# Patient Record
Sex: MALE | Race: WHITE | NOT HISPANIC OR LATINO | URBAN - METROPOLITAN AREA
[De-identification: names, ages, dates, MRNs, and addresses within clinical notes are randomized per-mention and may not be internally consistent; named-entity substitution may affect disease eponyms.]

---

## 2022-11-03 ENCOUNTER — WEB ENCOUNTER (OUTPATIENT)
Dept: URBAN - METROPOLITAN AREA CLINIC 94 | Facility: CLINIC | Age: 57
End: 2022-11-03

## 2022-11-07 ENCOUNTER — WEB ENCOUNTER (OUTPATIENT)
Dept: URBAN - METROPOLITAN AREA CLINIC 94 | Facility: CLINIC | Age: 57
End: 2022-11-07

## 2022-11-07 ENCOUNTER — OFFICE VISIT (OUTPATIENT)
Dept: URBAN - METROPOLITAN AREA CLINIC 94 | Facility: CLINIC | Age: 57
End: 2022-11-07
Payer: MEDICARE

## 2022-11-07 VITALS
WEIGHT: 241 LBS | HEIGHT: 70 IN | DIASTOLIC BLOOD PRESSURE: 82 MMHG | BODY MASS INDEX: 34.5 KG/M2 | HEART RATE: 63 BPM | SYSTOLIC BLOOD PRESSURE: 145 MMHG

## 2022-11-07 DIAGNOSIS — K21.9 GASTROESOPHAGEAL REFLUX DISEASE, UNSPECIFIED WHETHER ESOPHAGITIS PRESENT: ICD-10-CM

## 2022-11-07 DIAGNOSIS — R11.0 NAUSEA: ICD-10-CM

## 2022-11-07 PROBLEM — 235595009: Status: ACTIVE | Noted: 2022-11-07

## 2022-11-07 PROCEDURE — 99244 OFF/OP CNSLTJ NEW/EST MOD 40: CPT | Performed by: INTERNAL MEDICINE

## 2022-11-07 PROCEDURE — 99204 OFFICE O/P NEW MOD 45 MIN: CPT | Performed by: INTERNAL MEDICINE

## 2022-11-07 RX ORDER — DEXLANSOPRAZOLE 30 MG/1
1 CAPSULE CAPSULE, DELAYED RELEASE ORAL ONCE A DAY
Status: ACTIVE | COMMUNITY

## 2022-11-07 RX ORDER — TEMAZEPAM 30 MG/1
1 CAPSULE AT BEDTIME AS NEEDED CAPSULE ORAL ONCE A DAY
Status: ACTIVE | COMMUNITY

## 2022-11-07 RX ORDER — ACYCLOVIR 50 MG/G
1 APPLICATION CREAM TOPICAL THREE TIMES A DAY
Status: ON HOLD | COMMUNITY

## 2022-11-07 RX ORDER — GALCANEZUMAB 120 MG/ML
AS DIRECTED INJECTION, SOLUTION SUBCUTANEOUS
Status: ACTIVE | COMMUNITY

## 2022-11-07 RX ORDER — TRAZODONE HYDROCHLORIDE 100 MG/1
1 TABLET AT BEDTIME TABLET ORAL ONCE A DAY
Status: ACTIVE | COMMUNITY

## 2022-11-07 RX ORDER — TIMOLOL MALEATE 2.5 MG/ML
1 DROP INTO AFFECTED EYE SOLUTION/ DROPS OPHTHALMIC TWICE A DAY
Status: ACTIVE | COMMUNITY

## 2022-11-07 RX ORDER — METOPROLOL TARTRATE 50 MG/1
1 TABLET WITH FOOD TABLET, FILM COATED ORAL TWICE A DAY
Status: ACTIVE | COMMUNITY

## 2022-11-07 RX ORDER — INDOMETHACIN 50 MG/1
1 CAPSULE WITH FOOD OR MILK CAPSULE ORAL TWICE A DAY
Status: DISCONTINUED | COMMUNITY

## 2022-11-07 RX ORDER — ACETAMINOPHEN AND CODEINE PHOSPHATE 300; 30 MG/1; MG/1
1 TABLET AS NEEDED TABLET ORAL BID
Status: ACTIVE | COMMUNITY

## 2022-11-07 RX ORDER — MELOXICAM 15 MG/1
TAKE 1 TABLET BY MOUTH DAILY TABLET ORAL ONCE A DAY
Refills: 0 | Status: ACTIVE | COMMUNITY

## 2022-11-07 RX ORDER — ONABOTULINUMTOXINA 200 [USP'U]/1
AS DIRECTED INJECTION, POWDER, LYOPHILIZED, FOR SOLUTION INTRADERMAL; INTRAMUSCULAR
Status: DISCONTINUED | COMMUNITY

## 2022-11-07 RX ORDER — BRINZOLAMIDE 10 MG/ML
1 DROP INTO AFFECTED EYE SUSPENSION/ DROPS OPHTHALMIC BID
Status: ACTIVE | COMMUNITY

## 2022-11-07 RX ORDER — TIZANIDINE 4 MG/1
1 TABLET AS NEEDED TABLET ORAL
Refills: 0 | Status: ACTIVE | COMMUNITY

## 2022-11-07 NOTE — HPI-TODAY'S VISIT:
This is a 57  year old patient referred by Dr Irvin Hernandez for nausea, bloating and GERD A copy of this note will be sent to the referring provider.  Pt is a poor historian He has GERD for several years. Taking dexilant for > 5 years now. GERD symptoms seem mostly well controlled. However , he has excessive nausea, fullness and bloating after eating.Denies dysphagia.  Stools are dark, however denies black stools. BMs are described as normal Pt had EGD and COLONOSCOPY by Dr Peraza from surgery in 1/2018 which reportedly showed esophagitis, gastritis without H Pylori, diverticulosis and hemorrhoids. Denies family history of colorectal neoplasm.

## 2022-11-08 ENCOUNTER — CLAIMS CREATED FROM THE CLAIM WINDOW (OUTPATIENT)
Dept: URBAN - METROPOLITAN AREA CLINIC 4 | Facility: CLINIC | Age: 57
End: 2022-11-08
Payer: MEDICARE

## 2022-11-08 ENCOUNTER — CLAIMS CREATED FROM THE CLAIM WINDOW (OUTPATIENT)
Dept: URBAN - METROPOLITAN AREA SURGERY CENTER 17 | Facility: SURGERY CENTER | Age: 57
End: 2022-11-08
Payer: MEDICARE

## 2022-11-08 ENCOUNTER — CLAIMS CREATED FROM THE CLAIM WINDOW (OUTPATIENT)
Dept: URBAN - METROPOLITAN AREA SURGERY CENTER 17 | Facility: SURGERY CENTER | Age: 57
End: 2022-11-08

## 2022-11-08 DIAGNOSIS — R10.13 ABDOMINAL DISCOMFORT, EPIGASTRIC: ICD-10-CM

## 2022-11-08 DIAGNOSIS — K21.9 ACID REFLUX: ICD-10-CM

## 2022-11-08 DIAGNOSIS — K31.89 OTHER DISEASES OF STOMACH AND DUODENUM: ICD-10-CM

## 2022-11-08 DIAGNOSIS — K31.89 ACQUIRED DEFORMITY OF DUODENUM: ICD-10-CM

## 2022-11-08 PROCEDURE — 88305 TISSUE EXAM BY PATHOLOGIST: CPT | Performed by: PATHOLOGY

## 2022-11-08 PROCEDURE — G8907 PT DOC NO EVENTS ON DISCHARG: HCPCS | Performed by: INTERNAL MEDICINE

## 2022-11-08 PROCEDURE — 43239 EGD BIOPSY SINGLE/MULTIPLE: CPT | Performed by: INTERNAL MEDICINE

## 2022-11-08 RX ORDER — TIMOLOL MALEATE 2.5 MG/ML
1 DROP INTO AFFECTED EYE SOLUTION/ DROPS OPHTHALMIC TWICE A DAY
Status: ACTIVE | COMMUNITY

## 2022-11-08 RX ORDER — TEMAZEPAM 30 MG/1
1 CAPSULE AT BEDTIME AS NEEDED CAPSULE ORAL ONCE A DAY
Status: ACTIVE | COMMUNITY

## 2022-11-08 RX ORDER — MELOXICAM 15 MG/1
TAKE 1 TABLET BY MOUTH DAILY TABLET ORAL ONCE A DAY
Refills: 0 | Status: ACTIVE | COMMUNITY

## 2022-11-08 RX ORDER — ACETAMINOPHEN AND CODEINE PHOSPHATE 300; 30 MG/1; MG/1
1 TABLET AS NEEDED TABLET ORAL BID
Status: ACTIVE | COMMUNITY

## 2022-11-08 RX ORDER — TIZANIDINE 4 MG/1
1 TABLET AS NEEDED TABLET ORAL
Refills: 0 | Status: ACTIVE | COMMUNITY

## 2022-11-08 RX ORDER — DEXLANSOPRAZOLE 30 MG/1
1 CAPSULE CAPSULE, DELAYED RELEASE ORAL ONCE A DAY
Status: ACTIVE | COMMUNITY

## 2022-11-08 RX ORDER — GALCANEZUMAB 120 MG/ML
AS DIRECTED INJECTION, SOLUTION SUBCUTANEOUS
Status: ACTIVE | COMMUNITY

## 2022-11-08 RX ORDER — ACYCLOVIR 50 MG/G
1 APPLICATION CREAM TOPICAL THREE TIMES A DAY
Status: ON HOLD | COMMUNITY

## 2022-11-08 RX ORDER — METOPROLOL TARTRATE 50 MG/1
1 TABLET WITH FOOD TABLET, FILM COATED ORAL TWICE A DAY
Status: ACTIVE | COMMUNITY

## 2022-11-08 RX ORDER — TRAZODONE HYDROCHLORIDE 100 MG/1
1 TABLET AT BEDTIME TABLET ORAL ONCE A DAY
Status: ACTIVE | COMMUNITY

## 2022-11-08 RX ORDER — BRINZOLAMIDE 10 MG/ML
1 DROP INTO AFFECTED EYE SUSPENSION/ DROPS OPHTHALMIC BID
Status: ACTIVE | COMMUNITY

## 2022-12-05 ENCOUNTER — LAB OUTSIDE AN ENCOUNTER (OUTPATIENT)
Dept: URBAN - METROPOLITAN AREA CLINIC 94 | Facility: CLINIC | Age: 57
End: 2022-12-05

## 2022-12-05 ENCOUNTER — OFFICE VISIT (OUTPATIENT)
Dept: URBAN - METROPOLITAN AREA CLINIC 94 | Facility: CLINIC | Age: 57
End: 2022-12-05
Payer: MEDICARE

## 2022-12-05 VITALS
WEIGHT: 243 LBS | BODY MASS INDEX: 34.79 KG/M2 | SYSTOLIC BLOOD PRESSURE: 144 MMHG | TEMPERATURE: 97.5 F | HEART RATE: 53 BPM | DIASTOLIC BLOOD PRESSURE: 86 MMHG | HEIGHT: 70 IN

## 2022-12-05 DIAGNOSIS — R53.83 FATIGUE, UNSPECIFIED TYPE: ICD-10-CM

## 2022-12-05 DIAGNOSIS — R14.0 BLOATING: ICD-10-CM

## 2022-12-05 DIAGNOSIS — R19.7 DIARRHEA, UNSPECIFIED TYPE: ICD-10-CM

## 2022-12-05 PROCEDURE — 99214 OFFICE O/P EST MOD 30 MIN: CPT | Performed by: INTERNAL MEDICINE

## 2022-12-05 RX ORDER — MELOXICAM 15 MG/1
TAKE 1 TABLET BY MOUTH DAILY TABLET ORAL ONCE A DAY
Refills: 0 | Status: ACTIVE | COMMUNITY

## 2022-12-05 RX ORDER — GALCANEZUMAB 120 MG/ML
AS DIRECTED INJECTION, SOLUTION SUBCUTANEOUS
Status: ACTIVE | COMMUNITY

## 2022-12-05 RX ORDER — ACYCLOVIR 50 MG/G
1 APPLICATION CREAM TOPICAL THREE TIMES A DAY
Status: ON HOLD | COMMUNITY

## 2022-12-05 RX ORDER — METOPROLOL TARTRATE 50 MG/1
1 TABLET WITH FOOD TABLET, FILM COATED ORAL TWICE A DAY
Status: ACTIVE | COMMUNITY

## 2022-12-05 RX ORDER — DICYCLOMINE HYDROCHLORIDE 10 MG/1
1 TABLET CAPSULE ORAL THREE TIMES A DAY
Qty: 90 TABLET | Refills: 3 | OUTPATIENT
Start: 2022-12-05 | End: 2023-04-04

## 2022-12-05 RX ORDER — TEMAZEPAM 30 MG/1
1 CAPSULE AT BEDTIME AS NEEDED CAPSULE ORAL ONCE A DAY
Status: ACTIVE | COMMUNITY

## 2022-12-05 RX ORDER — BRINZOLAMIDE 10 MG/ML
1 DROP INTO AFFECTED EYE SUSPENSION/ DROPS OPHTHALMIC BID
Status: ACTIVE | COMMUNITY

## 2022-12-05 RX ORDER — TIZANIDINE 4 MG/1
1 TABLET AS NEEDED TABLET ORAL
Refills: 0 | Status: ACTIVE | COMMUNITY

## 2022-12-05 RX ORDER — ACETAMINOPHEN AND CODEINE PHOSPHATE 300; 30 MG/1; MG/1
1 TABLET AS NEEDED TABLET ORAL BID
Status: ACTIVE | COMMUNITY

## 2022-12-05 RX ORDER — DEXLANSOPRAZOLE 30 MG/1
1 CAPSULE CAPSULE, DELAYED RELEASE ORAL ONCE A DAY
Status: ACTIVE | COMMUNITY

## 2022-12-05 RX ORDER — TRAZODONE HYDROCHLORIDE 100 MG/1
1 TABLET AT BEDTIME TABLET ORAL ONCE A DAY
Status: ACTIVE | COMMUNITY

## 2022-12-05 RX ORDER — TIMOLOL MALEATE 2.5 MG/ML
1 DROP INTO AFFECTED EYE SOLUTION/ DROPS OPHTHALMIC TWICE A DAY
Status: ACTIVE | COMMUNITY

## 2022-12-06 LAB
A/G RATIO: 2.4
ABSOLUTE BASOPHILS: 30
ABSOLUTE EOSINOPHILS: 189
ABSOLUTE LYMPHOCYTES: 1274
ABSOLUTE MONOCYTES: 608
ABSOLUTE NEUTROPHILS: 3800
ALBUMIN: 4.4
ALKALINE PHOSPHATASE: 46
ALT (SGPT): 29
AST (SGOT): 21
BASOPHILS: 0.5
BILIRUBIN, TOTAL: 0.9
BUN/CREATININE RATIO: (no result)
BUN: 14
C-REACTIVE PROTEIN, QUANT: 0.7
CALCIUM: 9.6
CARBON DIOXIDE, TOTAL: 27
CHLORIDE: 105
CREATININE: 0.9
EGFR: 100
EOSINOPHILS: 3.2
GLOBULIN, TOTAL: 1.8
GLUCOSE: 89
HEMATOCRIT: 44.2
HEMOGLOBIN: 14.9
IMMUNOGLOBULIN A, QN, SERUM: 157
INTERPRETATION: (no result)
LYMPHOCYTES: 21.6
MCH: 28
MCHC: 33.7
MCV: 82.9
MONOCYTES: 10.3
MPV: 10.9
NEUTROPHILS: 64.4
PLATELET COUNT: 151
POTASSIUM: 4.1
PROTEIN, TOTAL: 6.2
RDW: 13.9
RED BLOOD CELL COUNT: 5.33
SODIUM: 140
T-TRANSGLUTAMINASE (TTG) IGA: <1
TSH: 0.88
WHITE BLOOD CELL COUNT: 5.9

## 2022-12-09 ENCOUNTER — LAB OUTSIDE AN ENCOUNTER (OUTPATIENT)
Dept: URBAN - METROPOLITAN AREA CLINIC 94 | Facility: CLINIC | Age: 57
End: 2022-12-09

## 2022-12-15 ENCOUNTER — TELEPHONE ENCOUNTER (OUTPATIENT)
Dept: URBAN - METROPOLITAN AREA CLINIC 92 | Facility: CLINIC | Age: 57
End: 2022-12-15

## 2022-12-15 LAB
CALPROTECTIN, STOOL - QDX: (no result)
GASTROINTESTINAL PATHOGEN: (no result)
PANCREATICELASTASE ELISA, STOOL: (no result)

## 2022-12-15 RX ORDER — AZITHROMYCIN MONOHYDRATE 500 MG/1
1 TABLET TABLET, FILM COATED ORAL ONCE A DAY
Qty: 3 TABLET | Refills: 1 | OUTPATIENT

## 2023-01-23 ENCOUNTER — OFFICE VISIT (OUTPATIENT)
Dept: URBAN - METROPOLITAN AREA CLINIC 94 | Facility: CLINIC | Age: 58
End: 2023-01-23
Payer: MEDICARE

## 2023-01-23 ENCOUNTER — LAB OUTSIDE AN ENCOUNTER (OUTPATIENT)
Dept: URBAN - METROPOLITAN AREA CLINIC 94 | Facility: CLINIC | Age: 58
End: 2023-01-23

## 2023-01-23 VITALS
HEIGHT: 70 IN | HEART RATE: 69 BPM | SYSTOLIC BLOOD PRESSURE: 116 MMHG | WEIGHT: 244 LBS | TEMPERATURE: 97 F | BODY MASS INDEX: 34.93 KG/M2 | DIASTOLIC BLOOD PRESSURE: 76 MMHG

## 2023-01-23 DIAGNOSIS — R10.84 GENERALIZED ABDOMINAL PAIN: ICD-10-CM

## 2023-01-23 DIAGNOSIS — R14.0 BLOATING: ICD-10-CM

## 2023-01-23 PROCEDURE — 99214 OFFICE O/P EST MOD 30 MIN: CPT | Performed by: INTERNAL MEDICINE

## 2023-01-23 RX ORDER — METOPROLOL TARTRATE 50 MG/1
1 TABLET WITH FOOD TABLET, FILM COATED ORAL TWICE A DAY
Status: ACTIVE | COMMUNITY

## 2023-01-23 RX ORDER — DICYCLOMINE HYDROCHLORIDE 10 MG/1
1 TABLET CAPSULE ORAL THREE TIMES A DAY
Qty: 90 TABLET | Refills: 3 | Status: ACTIVE | COMMUNITY
Start: 2022-12-05 | End: 2023-04-04

## 2023-01-23 RX ORDER — BRINZOLAMIDE 10 MG/ML
1 DROP INTO AFFECTED EYE SUSPENSION/ DROPS OPHTHALMIC BID
Status: ACTIVE | COMMUNITY

## 2023-01-23 RX ORDER — TIMOLOL MALEATE 2.5 MG/ML
1 DROP INTO AFFECTED EYE SOLUTION/ DROPS OPHTHALMIC TWICE A DAY
Status: ACTIVE | COMMUNITY

## 2023-01-23 RX ORDER — ACYCLOVIR 50 MG/G
1 APPLICATION CREAM TOPICAL THREE TIMES A DAY
Status: ON HOLD | COMMUNITY

## 2023-01-23 RX ORDER — TEMAZEPAM 30 MG/1
1 CAPSULE AT BEDTIME AS NEEDED CAPSULE ORAL ONCE A DAY
Status: ACTIVE | COMMUNITY

## 2023-01-23 RX ORDER — AZITHROMYCIN MONOHYDRATE 500 MG/1
1 TABLET TABLET, FILM COATED ORAL ONCE A DAY
Qty: 3 TABLET | Refills: 1 | Status: DISCONTINUED | COMMUNITY

## 2023-01-23 RX ORDER — GALCANEZUMAB 120 MG/ML
AS DIRECTED INJECTION, SOLUTION SUBCUTANEOUS
Status: ACTIVE | COMMUNITY

## 2023-01-23 RX ORDER — DEXLANSOPRAZOLE 30 MG/1
1 CAPSULE CAPSULE, DELAYED RELEASE ORAL ONCE A DAY
Status: ACTIVE | COMMUNITY

## 2023-01-23 RX ORDER — ACETAMINOPHEN AND CODEINE PHOSPHATE 300; 30 MG/1; MG/1
1 TABLET AS NEEDED TABLET ORAL BID
Status: ACTIVE | COMMUNITY

## 2023-01-23 RX ORDER — TRAZODONE HYDROCHLORIDE 100 MG/1
1 TABLET AT BEDTIME TABLET ORAL ONCE A DAY
Status: ACTIVE | COMMUNITY

## 2023-01-23 RX ORDER — MELOXICAM 15 MG/1
TAKE 1 TABLET BY MOUTH DAILY TABLET ORAL ONCE A DAY
Refills: 0 | Status: ACTIVE | COMMUNITY

## 2023-01-23 RX ORDER — TIZANIDINE 4 MG/1
1 TABLET AS NEEDED TABLET ORAL
Refills: 0 | Status: ACTIVE | COMMUNITY

## 2023-01-23 NOTE — HPI-TODAY'S VISIT:
Pt evaluated for GERD, abdominal pain, bloating , chronic diarrhea in 11/2022 EGD 11/2022 showed reflux esophagitis. Gastric and duodenal biopsies were normal. Stool studies showed enteroagressive E Coli for which he was treated with azithromycin Fecal elastase and calprotectin: WNL CBC, CMP, CRP, TSH, celiac panel: WNL Pt reportedly had colonoscopy by Dr Peraza in 2018 which reported diverticulosis and hemorrhoids. Pt on dicylomine and dexilant, which have not helped. Diarrhea improved after taking azithromycin Pt continue to have abdominal pain with bloating. Pain mostly in upper abdomen but becomes diffuse. Pain worsens with food Bloating and gas all the time

## 2023-02-09 ENCOUNTER — WEB ENCOUNTER (OUTPATIENT)
Dept: URBAN - METROPOLITAN AREA CLINIC 94 | Facility: CLINIC | Age: 58
End: 2023-02-09

## 2023-02-23 ENCOUNTER — OFFICE VISIT (OUTPATIENT)
Dept: URBAN - METROPOLITAN AREA CLINIC 94 | Facility: CLINIC | Age: 58
End: 2023-02-23
Payer: MEDICARE

## 2023-02-23 ENCOUNTER — TELEPHONE ENCOUNTER (OUTPATIENT)
Dept: URBAN - METROPOLITAN AREA CLINIC 94 | Facility: CLINIC | Age: 58
End: 2023-02-23

## 2023-02-23 ENCOUNTER — LAB OUTSIDE AN ENCOUNTER (OUTPATIENT)
Dept: URBAN - METROPOLITAN AREA CLINIC 94 | Facility: CLINIC | Age: 58
End: 2023-02-23

## 2023-02-23 VITALS
WEIGHT: 244 LBS | HEART RATE: 74 BPM | SYSTOLIC BLOOD PRESSURE: 128 MMHG | BODY MASS INDEX: 34.93 KG/M2 | HEIGHT: 70 IN | DIASTOLIC BLOOD PRESSURE: 77 MMHG | TEMPERATURE: 97.3 F

## 2023-02-23 DIAGNOSIS — R19.7 DIARRHEA, UNSPECIFIED TYPE: ICD-10-CM

## 2023-02-23 DIAGNOSIS — R10.33 PERIUMBILICAL ABDOMINAL PAIN: ICD-10-CM

## 2023-02-23 DIAGNOSIS — R16.1 SPLENOMEGALY: ICD-10-CM

## 2023-02-23 DIAGNOSIS — R11.0 NAUSEA: ICD-10-CM

## 2023-02-23 PROCEDURE — 99214 OFFICE O/P EST MOD 30 MIN: CPT | Performed by: INTERNAL MEDICINE

## 2023-02-23 RX ORDER — ACETAMINOPHEN AND CODEINE PHOSPHATE 300; 30 MG/1; MG/1
1 TABLET AS NEEDED TABLET ORAL BID
Status: ACTIVE | COMMUNITY

## 2023-02-23 RX ORDER — DEXLANSOPRAZOLE 30 MG/1
1 CAPSULE CAPSULE, DELAYED RELEASE ORAL ONCE A DAY
Status: ACTIVE | COMMUNITY

## 2023-02-23 RX ORDER — TIMOLOL MALEATE 2.5 MG/ML
1 DROP INTO AFFECTED EYE SOLUTION/ DROPS OPHTHALMIC TWICE A DAY
Status: ACTIVE | COMMUNITY

## 2023-02-23 RX ORDER — ACYCLOVIR 50 MG/G
1 APPLICATION CREAM TOPICAL THREE TIMES A DAY
Status: ON HOLD | COMMUNITY

## 2023-02-23 RX ORDER — DICYCLOMINE HYDROCHLORIDE 20 MG/1
1 TABLET TABLET ORAL THREE TIMES A DAY
Qty: 90 TABLET | Refills: 6
Start: 2022-12-05 | End: 2023-09-21

## 2023-02-23 RX ORDER — GALCANEZUMAB 120 MG/ML
AS DIRECTED INJECTION, SOLUTION SUBCUTANEOUS
Status: ACTIVE | COMMUNITY

## 2023-02-23 RX ORDER — MELOXICAM 15 MG/1
TAKE 1 TABLET BY MOUTH DAILY TABLET ORAL ONCE A DAY
Refills: 0 | Status: ACTIVE | COMMUNITY

## 2023-02-23 RX ORDER — METOPROLOL TARTRATE 50 MG/1
1 TABLET WITH FOOD TABLET, FILM COATED ORAL TWICE A DAY
Status: ACTIVE | COMMUNITY

## 2023-02-23 RX ORDER — BRINZOLAMIDE 10 MG/ML
1 DROP INTO AFFECTED EYE SUSPENSION/ DROPS OPHTHALMIC BID
Status: ACTIVE | COMMUNITY

## 2023-02-23 RX ORDER — TEMAZEPAM 30 MG/1
1 CAPSULE AT BEDTIME AS NEEDED CAPSULE ORAL ONCE A DAY
Status: ACTIVE | COMMUNITY

## 2023-02-23 RX ORDER — TIZANIDINE 4 MG/1
1 TABLET AS NEEDED TABLET ORAL
Refills: 0 | Status: ACTIVE | COMMUNITY

## 2023-02-23 RX ORDER — DICYCLOMINE HYDROCHLORIDE 10 MG/1
1 TABLET CAPSULE ORAL THREE TIMES A DAY
Qty: 90 TABLET | Refills: 3 | Status: ACTIVE | COMMUNITY
Start: 2022-12-05 | End: 2023-04-04

## 2023-02-23 RX ORDER — TRAZODONE HYDROCHLORIDE 100 MG/1
1 TABLET AT BEDTIME TABLET ORAL ONCE A DAY
Status: ACTIVE | COMMUNITY

## 2023-02-23 NOTE — HPI-TODAY'S VISIT:
Pt evaluated for GERD, abdominal pain, bloating , chronic diarrhea in 11/2022 EGD 11/2022 showed reflux esophagitis. Gastric and duodenal biopsies were normal. Stool studies showed enteroagressive E Coli for which he was treated with azithromycin Fecal elastase and calprotectin: WNL CBC, CMP, CRP, TSH, celiac panel: WNL Pt reportedly had colonoscopy by Dr Peraza in 2018 which reported diverticulosis and hemorrhoids.  Diarrhea improved after taking azithromycin  CT A/P 1/2023: Splenomegaly and bilateral adrenal adenomas HBT: Negative for SIBO.  Pt was started on IB guard, dexilant and dicyclomine 1 month ago. Pt states that he never got dicyclomine.  He has postprandial abdominal pain with fullness in tim umbilical area. Nausea without vomiting Bloating, cramping and diarrhea after eating Denies anorexia or weight loss.

## 2023-04-05 ENCOUNTER — OFFICE VISIT (OUTPATIENT)
Dept: URBAN - METROPOLITAN AREA CLINIC 94 | Facility: CLINIC | Age: 58
End: 2023-04-05
Payer: MEDICARE

## 2023-04-05 VITALS
WEIGHT: 247 LBS | DIASTOLIC BLOOD PRESSURE: 71 MMHG | HEIGHT: 70 IN | HEART RATE: 66 BPM | BODY MASS INDEX: 35.36 KG/M2 | TEMPERATURE: 97.2 F | SYSTOLIC BLOOD PRESSURE: 113 MMHG

## 2023-04-05 DIAGNOSIS — R16.1 SPLENOMEGALY: ICD-10-CM

## 2023-04-05 DIAGNOSIS — R19.7 DIARRHEA, UNSPECIFIED TYPE: ICD-10-CM

## 2023-04-05 DIAGNOSIS — R11.0 NAUSEA: ICD-10-CM

## 2023-04-05 DIAGNOSIS — R10.33 PERIUMBILICAL ABDOMINAL PAIN: ICD-10-CM

## 2023-04-05 PROCEDURE — 99213 OFFICE O/P EST LOW 20 MIN: CPT | Performed by: INTERNAL MEDICINE

## 2023-04-05 RX ORDER — TEMAZEPAM 30 MG/1
1 CAPSULE AT BEDTIME AS NEEDED CAPSULE ORAL ONCE A DAY
Status: ACTIVE | COMMUNITY

## 2023-04-05 RX ORDER — TIZANIDINE 4 MG/1
1 TABLET AS NEEDED TABLET ORAL
Refills: 0 | Status: ACTIVE | COMMUNITY

## 2023-04-05 RX ORDER — METOPROLOL TARTRATE 50 MG/1
1 TABLET WITH FOOD TABLET, FILM COATED ORAL TWICE A DAY
Status: ACTIVE | COMMUNITY

## 2023-04-05 RX ORDER — DEXLANSOPRAZOLE 30 MG/1
1 CAPSULE CAPSULE, DELAYED RELEASE ORAL ONCE A DAY
Status: ACTIVE | COMMUNITY

## 2023-04-05 RX ORDER — DICYCLOMINE HYDROCHLORIDE 20 MG/1
1 TABLET TABLET ORAL THREE TIMES A DAY
Qty: 90 TABLET | Refills: 6 | Status: ACTIVE | COMMUNITY
Start: 2022-12-05 | End: 2023-09-21

## 2023-04-05 RX ORDER — BRINZOLAMIDE 10 MG/ML
1 DROP INTO AFFECTED EYE SUSPENSION/ DROPS OPHTHALMIC BID
Status: ACTIVE | COMMUNITY

## 2023-04-05 RX ORDER — GALCANEZUMAB 120 MG/ML
AS DIRECTED INJECTION, SOLUTION SUBCUTANEOUS
Status: ACTIVE | COMMUNITY

## 2023-04-05 RX ORDER — TIMOLOL MALEATE 2.5 MG/ML
1 DROP INTO AFFECTED EYE SOLUTION/ DROPS OPHTHALMIC TWICE A DAY
Status: ACTIVE | COMMUNITY

## 2023-04-05 RX ORDER — DICYCLOMINE HYDROCHLORIDE 20 MG/1
1 TABLET TABLET ORAL THREE TIMES A DAY
Qty: 90 TABLET | Refills: 6

## 2023-04-05 RX ORDER — ACYCLOVIR 50 MG/G
1 APPLICATION CREAM TOPICAL THREE TIMES A DAY
Status: ON HOLD | COMMUNITY

## 2023-04-05 RX ORDER — MELOXICAM 15 MG/1
TAKE 1 TABLET BY MOUTH DAILY TABLET ORAL ONCE A DAY
Refills: 0 | Status: ACTIVE | COMMUNITY

## 2023-04-05 RX ORDER — ACETAMINOPHEN AND CODEINE PHOSPHATE 300; 30 MG/1; MG/1
1 TABLET AS NEEDED TABLET ORAL BID
Status: ACTIVE | COMMUNITY

## 2023-04-05 RX ORDER — TRAZODONE HYDROCHLORIDE 100 MG/1
1 TABLET AT BEDTIME TABLET ORAL ONCE A DAY
Status: ACTIVE | COMMUNITY

## 2023-04-05 NOTE — HPI-TODAY'S VISIT:
Pt evaluated for GERD, abdominal pain, bloating, chronic diarrhea in 11/2022 EGD 11/2022 showed reflux esophagitis. Gastric and duodenal biopsies were normal. Stool studies showed enteroagressive E Coli for which he was treated with azithromycin Fecal elastase and calprotectin: WNL CBC, CMP, CRP, TSH, celiac panel: WNL Pt reportedly had colonoscopy by Dr Peraza in 2018 which reported diverticulosis and hemorrhoids. Diarrhea improved after taking azithromycin  CT A/P 1/2023: Splenomegaly and bilateral adrenal adenomas HBT: Negative for SIBO. Gastric emptying scan 3/2023: WNL.  Pt was started on IB guard, Dexilant and dicyclomine Pt states that his GI symptoms are well controlled now  Pt saw Heme-Onc (Kenyon Mendoza) for Splenomegaly, pt states that he had a bone marrow biopsy yesterday.

## 2023-06-29 NOTE — PHYSICAL EXAM CHEST:
no lesions,  no deformities,  no traumatic injuries,  no significant scars are present,  chest wall non-tender,  no masses present, breathing is unlabored without accessory muscle use,normal breath sounds Itraconazole Counseling:  I discussed with the patient the risks of itraconazole including but not limited to liver damage, nausea/vomiting, neuropathy, and severe allergy.  The patient understands that this medication is best absorbed when taken with acidic beverages such as non-diet cola or ginger ale.  The patient understands that monitoring is required including baseline LFTs and repeat LFTs at intervals.  The patient understands that they are to contact us or the primary physician if concerning signs are noted.

## 2023-10-12 ENCOUNTER — OFFICE VISIT (OUTPATIENT)
Dept: URBAN - METROPOLITAN AREA CLINIC 94 | Facility: CLINIC | Age: 58
End: 2023-10-12
Payer: MEDICARE

## 2023-10-12 ENCOUNTER — DASHBOARD ENCOUNTERS (OUTPATIENT)
Age: 58
End: 2023-10-12

## 2023-10-12 VITALS
HEIGHT: 70 IN | WEIGHT: 249 LBS | SYSTOLIC BLOOD PRESSURE: 126 MMHG | TEMPERATURE: 97.3 F | HEART RATE: 46 BPM | BODY MASS INDEX: 35.65 KG/M2 | DIASTOLIC BLOOD PRESSURE: 78 MMHG

## 2023-10-12 DIAGNOSIS — R14.0 BLOATING: ICD-10-CM

## 2023-10-12 DIAGNOSIS — R16.1 SPLENOMEGALY: ICD-10-CM

## 2023-10-12 DIAGNOSIS — K58.0 IRRITABLE BOWEL SYNDROME WITH DIARRHEA: ICD-10-CM

## 2023-10-12 DIAGNOSIS — K21.9 GASTROESOPHAGEAL REFLUX DISEASE, UNSPECIFIED WHETHER ESOPHAGITIS PRESENT: ICD-10-CM

## 2023-10-12 PROBLEM — 197125005: Status: ACTIVE | Noted: 2023-10-12

## 2023-10-12 PROCEDURE — 99214 OFFICE O/P EST MOD 30 MIN: CPT | Performed by: INTERNAL MEDICINE

## 2023-10-12 RX ORDER — ACETAMINOPHEN AND CODEINE PHOSPHATE 300; 30 MG/1; MG/1
1 TABLET AS NEEDED TABLET ORAL BID
Status: ACTIVE | COMMUNITY

## 2023-10-12 RX ORDER — MELOXICAM 15 MG/1
TAKE 1 TABLET BY MOUTH DAILY TABLET ORAL ONCE A DAY
Refills: 0 | Status: ACTIVE | COMMUNITY

## 2023-10-12 RX ORDER — TIZANIDINE 4 MG/1
1 TABLET AS NEEDED TABLET ORAL
Refills: 0 | Status: ACTIVE | COMMUNITY

## 2023-10-12 RX ORDER — ACYCLOVIR 50 MG/G
1 APPLICATION CREAM TOPICAL THREE TIMES A DAY
Status: ON HOLD | COMMUNITY

## 2023-10-12 RX ORDER — DICYCLOMINE HYDROCHLORIDE 20 MG/1
1 TABLET TABLET ORAL THREE TIMES A DAY
Qty: 90 TABLET | Refills: 6 | Status: ACTIVE | COMMUNITY

## 2023-10-12 RX ORDER — TEMAZEPAM 30 MG/1
1 CAPSULE AT BEDTIME AS NEEDED CAPSULE ORAL ONCE A DAY
Status: ACTIVE | COMMUNITY

## 2023-10-12 RX ORDER — METOPROLOL TARTRATE 50 MG/1
1 TABLET WITH FOOD TABLET, FILM COATED ORAL TWICE A DAY
Status: ACTIVE | COMMUNITY

## 2023-10-12 RX ORDER — TRAZODONE HYDROCHLORIDE 100 MG/1
1 TABLET AT BEDTIME TABLET ORAL ONCE A DAY
Status: ACTIVE | COMMUNITY

## 2023-10-12 RX ORDER — GALCANEZUMAB 120 MG/ML
AS DIRECTED INJECTION, SOLUTION SUBCUTANEOUS
Status: ACTIVE | COMMUNITY

## 2023-10-12 RX ORDER — BRINZOLAMIDE 10 MG/ML
1 DROP INTO AFFECTED EYE SUSPENSION/ DROPS OPHTHALMIC BID
Status: ACTIVE | COMMUNITY

## 2023-10-12 RX ORDER — TIMOLOL MALEATE 2.5 MG/ML
1 DROP INTO AFFECTED EYE SOLUTION/ DROPS OPHTHALMIC TWICE A DAY
Status: ACTIVE | COMMUNITY

## 2023-10-12 RX ORDER — DEXLANSOPRAZOLE 30 MG/1
1 CAPSULE CAPSULE, DELAYED RELEASE ORAL ONCE A DAY
Status: ACTIVE | COMMUNITY

## 2023-10-12 NOTE — PHYSICAL EXAM CARDIOVASCULAR:
01/31/19 1100   Group 1   Start Time 0830   Stop Time 0915   Length (min) 39 Min   Group Name Check-In   Focus of Group symptoms/set goals   Attendance Present   Mood Anxious;Stressed   Affect Positive; Tense   Behavior/Socialization Engaged; Cooperative   Thought Process Racing thoughts;Tracking   Patient Response Attentive; Interactive   Task Performance Follows directions   Safety Concerns Other   Degree Patient Ready for Discharge No   Group Notes open and sharing in discussion   Group 2   Start Time 1030   Stop Time 1115   Length (min) 45 min   Group Name Education   Focus of Group motivation skills   Attendance Present   Mood Bright   Affect Calm;Positive   Behavior/Socialization Cooperative;Engaged; Supportive   Thought Process Focused;Tracking   Patient Response Attentive; Interactive; Took notes   Task Performance Follows directions   Group Notes very engaged in discussion   Jyoti Rueda no edema, no murmurs, regular rate and rhythm

## 2023-10-12 NOTE — HPI-TODAY'S VISIT:
Pt evaluated for GERD, abdominal pain, bloating, chronic diarrhea in 11/2022 EGD 11/2022 showed reflux esophagitis. Gastric and duodenal biopsies were normal. Stool studies showed enteroagressive E Coli for which he was treated with azithromycin Fecal elastase and calprotectin: WNL CBC, CMP, CRP, TSH, celiac panel: WNL Pt reportedly had colonoscopy by Dr Peraza in 2018 which reported diverticulosis and hemorrhoids. Diarrhea improved after taking azithromycin  CT A/P 1/2023: Splenomegaly and bilateral adrenal adenomas HBT: Negative for SIBO. Gastric emptying scan 3/2023: WNL.  Pt was started on IB guard, Dexilant and dicyclomine Pt states that his GI symptoms are well controlled now  Pt saw Heme-Onc (Kenyon Mendoza) for Splenomegaly, pt states that he had a bone marrow biopsy . He states that Heme-Onc is monitoring with serial CT scans

## 2024-05-01 ENCOUNTER — WEB ENCOUNTER (OUTPATIENT)
Dept: URBAN - METROPOLITAN AREA CLINIC 94 | Facility: CLINIC | Age: 59
End: 2024-05-01

## 2024-05-02 ENCOUNTER — OFFICE VISIT (OUTPATIENT)
Dept: URBAN - METROPOLITAN AREA CLINIC 94 | Facility: CLINIC | Age: 59
End: 2024-05-02

## 2024-12-19 ENCOUNTER — TELEPHONE ENCOUNTER (OUTPATIENT)
Dept: URBAN - METROPOLITAN AREA CLINIC 6 | Facility: CLINIC | Age: 59
End: 2024-12-19
